# Patient Record
Sex: FEMALE | Race: WHITE
[De-identification: names, ages, dates, MRNs, and addresses within clinical notes are randomized per-mention and may not be internally consistent; named-entity substitution may affect disease eponyms.]

---

## 2017-07-08 NOTE — EDM.PDOC
ED HPI GENERAL MEDICAL PROBLEM





- General


Chief Complaint: Headache


Stated Complaint: MIGRAINE


Time Seen by Provider: 17 17:57


Source of Information: Reports: Patient, RN Notes Reviewed





- History of Present Illness


INITIAL COMMENTS - FREE TEXT/NARRATIVE: 





31-year-old female comes in with headache. This first started 4 days ago. The 

headache is been present for most of the 4 days. She does have history of 

migraines and this is fairly similar. The discomfort this time is more left-

sided and also pounding and throbbing in nature. She has had some nausea with 

this. No recent fever chills or other unusual symptomatology.


Treatments PTA: Reports: Other (see below)


Other Treatments PTA: excederin migraine at 1000


  ** front of head/top left head


Pain Score (Numeric/FACES): 6





- Related Data


 Allergies











Allergy/AdvReac Type Severity Reaction Status Date / Time


 


Penicillins Allergy  Rash Verified 17 17:52


 


codeine AdvReac  Nausea Verified 17 17:52











Home Meds: 


 Home Meds





ALPRAZolam [Alprazolam] 1 tab PO DAILY PRN 17 [History]


Sertraline HCl [Sertraline HCl] 1 tab PO DAILY 17 [History]


atorvaSTATin Calcium [Atorvastatin Calcium] 1 tab PO DAILY 17 [History]


traZODone HCl [Trazodone HCl] 1 tab PO ASDIRECTED 17 [History]











Past Medical History


Cardiovascular History: Reports: High Cholesterol


Neurological History: Reports: Headaches, Chronic, Migraines


Psychiatric History: Reports: Anxiety, Depression





- Past Surgical History


Female  Surgical History: Reports:  Section





Social & Family History





- Family History


Family Medical History: Noncontributory





- Tobacco Use


Smoking Status *Q: Current Every Day Smoker


Years of Tobacco use: 1


Packs/Tins Daily: 1





- Caffeine Use


Caffeine Use: Reports: Soda





- Recreational Drug Use


Recreational Drug Use: No





ED ROS GENERAL





- Review of Systems


Review Of Systems: See Below


Constitutional: Denies: Fever, Chills


HEENT: Denies: Sinus Problem, Throat Pain, Vision Change


Respiratory: Denies: Shortness of Breath


Cardiovascular: Denies: Chest Pain


GI/Abdominal: Reports: Nausea.  Denies: Abdominal Pain, Vomiting


Musculoskeletal: Denies: Neck Pain, Back Pain


Skin: Reports: No Symptoms


Neurological: Reports: Headache.  Denies: Numbness, Tingling





- Physical Exam


Exam: See Below


General Appearance: Alert, Moderate Distress


Eye Exam: Bilateral Eye: PERRL


Throat/Mouth: Normal Inspection, Normal Oropharynx


Head Exam: Atraumatic.  No: Facial Swelling


Neck: Supple, Full Range of Motion


Respiratory/Chest: No Respiratory Distress, Lungs Clear, Normal Breath Sounds


Cardiovascular: Regular Rate, Rhythm


Neuro Exam (Abbreviated): Alert, Oriented, No Motor/Sensory Deficits


Skin Exam: Warm, Dry, Normal Color





Course





- Vital Signs


Last Recorded V/S: 


 Last Vital Signs











Temp  98.1 F   17 17:48


 


Pulse  98   17 17:48


 


Resp  18   17 17:48


 


BP  127/85   17 17:48


 


Pulse Ox  98   17 17:48














- Orders/Labs/Meds


Orders: 


 Active Orders 24 hr











 Category Date Time Status


 


 Peripheral IV Care [RC] .AS DIRECTED Care  17 18:04 Active


 


 Ketorolac [Toradol] Med  17 18:15 Active





 30 mg IVPUSH ONETIME   


 


 Sodium Chloride 0.9% [Normal Saline] 1,000 ml Med  17 18:15 Active





 IV ONETIME   


 


 Sodium Chloride 0.9% [Saline Flush] Med  17 18:04 Active





 10 ml FLUSH ASDIRECTED PRN   


 


 Peripheral IV Insertion Adult [OM.PC] Stat Oth  17 18:04 Ordered








 Medication Orders





Sodium Chloride (Normal Saline)  1,000 mls @ 999 mls/hr IV ONETIME LIZBETH


   Last Admin: 17 18:35  Dose: 999 mls/hr


Ketorolac Tromethamine (Toradol)  30 mg IVPUSH ONETIME LIZBETH


   Last Admin: 17 18:31  Dose: 30 mg


Sodium Chloride (Saline Flush)  10 ml FLUSH ASDIRECTED PRN


   PRN Reason: Keep Vein Open


   Last Admin: 17 18:25  Dose: 10 ml








Meds: 


Medications











Generic Name Dose Route Start Last Admin





  Trade Name Freq  PRN Reason Stop Dose Admin


 


Sodium Chloride  1,000 mls @ 999 mls/hr  17 18:15  17 18:35





  Normal Saline  IV   999 mls/hr





  ONETIME LIZBETH   Administration


 


Ketorolac Tromethamine  30 mg  17 18:15  17 18:31





  Toradol  IVPUSH   30 mg





  ONETIME LIZBETH   Administration


 


Sodium Chloride  10 ml  17 18:04  17 18:25





  Saline Flush  FLUSH   10 ml





  ASDIRECTED PRN   Administration





  Keep Vein Open   














Discontinued Medications














Generic Name Dose Route Start Last Admin





  Trade Name Judy  PRN Reason Stop Dose Admin


 


Diphenhydramine HCl  50 mg  17 18:12  17 18:33





  Benadryl  IVPUSH  17 18:13  50 mg





  ONETIME ONE   Administration


 


Metoclopramide HCl  10 mg  17 18:12  17 18:27





  Reglan  IVPUSH  17 18:13  10 mg





  ONETIME ONE   Administration














- Re-Assessments/Exams


Free Text/Narrative Re-Assessment/Exam: 





17 18:56


Feeling better after IV Toradol, Reglan and Benadryl. The headache is down to 

about a "4". She would like to wait give this a bit longer and then see if she 

is going to need further medication or not prior to discharge. I am at the end 

of my shift. I will visit with nursing staff, Haldol 5 mg IV would be the next 

medication given if needed.





Departure





- Departure


Time of Disposition: 19:25


Disposition: Home, Self-Care 01


Condition: Fair


Clinical Impression: 


 Migraine








- Discharge Information


Forms:  ED Department Discharge


Additional Instructions: 


Rest, continue current medications as previously prescribed, follow-up clinic 

if not much better by morning as expected, return to ED as needed.





- My Orders


Last 24 Hours: 


My Active Orders





17 18:04


Peripheral IV Care [RC] .AS DIRECTED 


Sodium Chloride 0.9% [Saline Flush]   10 ml FLUSH ASDIRECTED PRN 


Peripheral IV Insertion Adult [OM.PC] Stat 





17 18:15


Ketorolac [Toradol]   30 mg IVPUSH ONETIME 


Sodium Chloride 0.9% [Normal Saline] 1,000 ml IV ONETIME 














- Assessment/Plan


Last 24 Hours: 


My Active Orders





17 18:04


Peripheral IV Care [RC] .AS DIRECTED 


Sodium Chloride 0.9% [Saline Flush]   10 ml FLUSH ASDIRECTED PRN 


Peripheral IV Insertion Adult [OM.PC] Stat 





17 18:15


Ketorolac [Toradol]   30 mg IVPUSH ONETIME 


Sodium Chloride 0.9% [Normal Saline] 1,000 ml IV ONETIME

## 2017-12-11 NOTE — EDM.PDOC
ED HPI GENERAL MEDICAL PROBLEM





- General


Chief Complaint: ENT Problem


Stated Complaint: THROAT PAIN AND TROUBLE BREATHING


Time Seen by Provider: 17 02:35





- History of Present Illness


INITIAL COMMENTS - FREE TEXT/NARRATIVE: 





32-year-old female presents emergency room with a sore throat.





This started about a day ago she's noted some swelling to the back of her 

throat. She has not had any fevers or chills. She has not had any abdominal 

pain no nausea no vomiting no diarrhea no cough or congestion.


  ** Throat


Pain Score (Numeric/FACES): 10





- Related Data


 Allergies











Allergy/AdvReac Type Severity Reaction Status Date / Time


 


Penicillins Allergy  Rash Verified 17 02:19


 


codeine AdvReac  Nausea Verified 17 02:19











Home Meds: 


 Home Meds





ALPRAZolam [Alprazolam] 1 tab PO DAILY PRN 17 [History]


Sertraline HCl [Sertraline HCl] 1 tab PO DAILY 17 [History]


atorvaSTATin Calcium [Atorvastatin Calcium] 1 tab PO DAILY 17 [History]


traZODone HCl [Trazodone HCl] 1 tab PO ASDIRECTED 17 [History]


Clindamycin HCl 300 mg PO Q8H #30 capsule 17 [Rx]


predniSONE [Prednisone] 10 mg PO QAM #3 tablet 17 [Rx]











Past Medical History


Cardiovascular History: Reports: High Cholesterol


Neurological History: Reports: Headaches, Chronic, Migraines


Psychiatric History: Reports: Anxiety, Depression





- Past Surgical History


Female  Surgical History: Reports:  Section





Social & Family History





- Family History


Family Medical History: Noncontributory





- Tobacco Use


Smoking Status *Q: Former Smoker


Years of Tobacco use: 15


Packs/Tins Daily: 1


Used Tobacco, but Quit: Yes


Month Tobacco Last Used: 2016





- Caffeine Use


Caffeine Use: Reports: None





- Recreational Drug Use


Recreational Drug Use: No





ED ROS ENT





- Review of Systems


Review Of Systems: Unable To Obtain


Constitutional: Reports: No Symptoms


HEENT: Reports: Throat Pain


Respiratory: Reports: No Symptoms


Cardiovascular: Reports: No Symptoms


GI/Abdominal: Reports: No Symptoms





ED EXAM, ENT





- Physical Exam


Exam: See Below


Exam Limited By: No Limitations


General Appearance: Alert, No Apparent Distress


Ears: Normal External Exam, Normal Canal, Normal TMs


Nose: Normal Inspection, Normal Mucousa


Mouth/Throat: Normal Gums, Normal Lips, Normal Teeth, Other (Posterior pharynx 

is moderately erythematous no exudate mildly swollen)


Head: Atraumatic, Normocephalic


Neck: Normal Inspection, Supple, Non-Tender.  No: Lymphadenopathy (L), 

Lymphadenopathy (R)


Respiratory/Chest: No Respiratory Distress, Lungs Clear, Normal Breath Sounds


Cardiovascular: Normal Peripheral Pulses, Regular Rate, Rhythm, No Edema


GI/Abdominal: Normal Bowel Sounds, Soft, Non-Tender, Other (No splenic or 

hepatic enlargement)





Course





- Vital Signs


Last Recorded V/S: 


 Last Vital Signs











Temp  37.2 C   17 02:15


 


Pulse  94   17 02:15


 


Resp  18   17 02:15


 


BP  149/86 H  17 02:15


 


Pulse Ox  100   17 02:15














- Orders/Labs/Meds


Orders: 


 Active Orders 24 hr











 Category Date Time Status


 


 STREP SCRN A RAPID W CULT CONF [RM] Stat Lab  17 02:31 Results











Meds: 


Medications














Discontinued Medications














Generic Name Dose Route Start Last Admin





  Trade Name Judy  PRN Reason Stop Dose Admin


 


Clindamycin HCl  300 mg  17 03:00  





  Cleocin  PO  17 03:01  





  ONETIME ONE   


 


Prednisone  40 mg  17 02:59  





  Prednisone  PO  17 03:00  





  ONETIME ONE   














- Re-Assessments/Exams


Free Text/Narrative Re-Assessment/Exam: 





17 03:11


Rapid strep is positive she'll be started on clindamycin as she is pen allergic 

and she'll be started on low-dose steroids.





Departure





- Departure


Time of Disposition: 03:11


Disposition: Home, Self-Care 01


Clinical Impression: 


 Strep pharyngitis








- Discharge Information


Prescriptions: 


Clindamycin HCl 300 mg PO Q8H #30 capsule


predniSONE [Prednisone] 10 mg PO QAM #3 tablet


Referrals: 


Ines Young PA-C [Primary Care Provider] - 


Forms:  ED Department Discharge


Additional Instructions: 


Return to the emergency room with any questions problems or worsening symptoms.





You been started on 2 medications the first ones prednisone this is a steroid 

this is to help with the discomfort and swelling in the area your given 40 mg 

in the emergency room this morning starting Tuesday morning when she did take 

30 mg every morning for 3 days. You have also been started on clindamycin this 

is an antibiotic he take one 3 times a day for 10 days.





Follow-up in the clinic at the end of this week if needed. You are contagious 

for the first 24 hours after starting therapy.





- My Orders


Last 24 Hours: 


My Active Orders





17 02:31


STREP SCRN A RAPID W CULT CONF [RM] Stat 














- Assessment/Plan


Last 24 Hours: 


My Active Orders





17 02:31


STREP SCRN A RAPID W CULT CONF [RM] Stat

## 2019-09-19 ENCOUNTER — HOSPITAL ENCOUNTER (EMERGENCY)
Dept: HOSPITAL 41 - JD.ED | Age: 34
LOS: 1 days | Discharge: PSYCHIATRIC OR SUBSTANCE USE FACILITY | End: 2019-09-20
Payer: COMMERCIAL

## 2019-09-19 VITALS — SYSTOLIC BLOOD PRESSURE: 150 MMHG | DIASTOLIC BLOOD PRESSURE: 88 MMHG

## 2019-09-19 DIAGNOSIS — F32.9: Primary | ICD-10-CM

## 2019-09-19 DIAGNOSIS — F17.210: ICD-10-CM

## 2019-09-19 DIAGNOSIS — E78.00: ICD-10-CM

## 2019-09-19 DIAGNOSIS — Z88.5: ICD-10-CM

## 2019-09-19 DIAGNOSIS — F41.9: ICD-10-CM

## 2019-09-19 DIAGNOSIS — Z88.0: ICD-10-CM

## 2019-09-19 DIAGNOSIS — Z79.899: ICD-10-CM

## 2019-09-19 LAB — APAP SERPL-MCNC: 0 UG/ML (ref 10–30)

## 2019-09-19 PROCEDURE — G0480 DRUG TEST DEF 1-7 CLASSES: HCPCS

## 2019-09-19 NOTE — EDM.PDOCBH
ED HPI GENERAL MEDICAL PROBLEM





- General


Chief Complaint: Behavioral/Psych


Stated Complaint: HAVING BAD THOUGHTS


Time Seen by Provider: 19 19:02


Source of Information: Reports: Patient


History Limitations: Reports: Other (Flat affect, slightly angry, reluctant to 

answer questions or elaborate)





- History of Present Illness


INITIAL COMMENTS - FREE TEXT/NARRATIVE: 





The patient states that she felt like harming herself by driving her car in to 

something, on 2 occasions today. She acknowledges that she is under increased 

stress. She states that she filed a restraining order on a coworker, but that 

the coworker violated the restraining order. She doesn't feel threatened, 

exactly, but she does feel unsafe. She denies having harmed herself today, or 

ever. She denies drinking alcohol or using any recreational drugs, recently.





The patient states that she has had similar thoughts of harming herself on 2 

prior occasions; the first when she was in high school, the second about 3 

years ago. On neither of those occasions was she psychiatrically evaluated, 

however, her PCP started her on Wellbutrin and sertraline after the episode 3 

years ago. The patient states that she has never seen a Psychiatrist or been 

psychiatrically hospitalized.





She states that she stopped all of her medications, including her Wellbutrin, 

sertraline, alprazolam, metformin, and atorvastatin about 2 weeks ago. She 

states that she didn't think she needed them anymore.





The patient denies having recent illness, such as fever, chills, cough, dyspnea

, chest pain, palpitations, nausea, vomiting, constipation, diarrhea, abdominal 

pain, urinary symptoms, recent weight gain or weight loss, recent bloody bowel 

movements or black bowel movements, joint aches, headaches, or rashes.





The patient's PCP is MESFIN De La Paz.


She has seen Brooklyn Carrillo NP for women's health in the past.











- Related Data


 Allergies











Allergy/AdvReac Type Severity Reaction Status Date / Time


 


Penicillins Allergy  Rash Verified 19 18:52


 


codeine AdvReac  Nausea Verified 19 18:52











Home Meds: 


 Home Meds





ALPRAZolam [Alprazolam] 1 tab PO DAILY PRN 17 [History]


Sertraline HCl 150 mg PO DAILY 17 [History]


atorvaSTATin Calcium [Atorvastatin Calcium] 1 tab PO DAILY 17 [History]


traZODone HCl [Trazodone HCl] 1 tab PO ASDIRECTED 17 [History]


Copper [Paragard T 380-A] 1 each IY ASDIRECTED 19 [History]


Hydrocodone Bit/Homatrop Me-Br [Hydrocodone Compound Syrup] 5 ml PO Q6H PRN #1 

bottle 19 [Rx]


buPROPion [Wellbutrin] 0 mg PO DAILY 19 [History]


metFORMIN [Glucophage] 500 mg PO BIDMEALS 19 [History]











Past Medical History


HEENT History: Reports: Impaired Vision


Cardiovascular History: Reports: High Cholesterol (untreated)


OB/GYN History: Reports: Pregnancy


Psychiatric History: Reports: Anxiety (untreated), Depression (untreated)


Endocrine/Metabolic History: Reports: Other (See Below) (Insulin resistance, 

untreated)





- Past Surgical History


HEENT Surgical History: Reports: Oral Surgery (wisdom teeth extraction)


Female  Surgical History: Reports:  Section (x 3)





Social & Family History





- Family History


Family Medical History: Noncontributory





- Tobacco Use


Smoking Status *Q: Current Every Day Smoker


Years of Tobacco use: 15


Packs/Tins Daily: 0.3


Packs/Tins Daily Comment: Down from 1/2 ppd





- Caffeine Use


Caffeine Use: Reports: Coffee, Soda





- Alcohol Use


Alcohol Use History: Yes


Alcohol Use Frequency: Socially (to excess on occasion)





- Recreational Drug Use


Recreational Drug Use: Yes


Drug Use in Last 12 Months: No


Recreational Drug Type: Reports: Marijuana/Hashish (last smoked in HS)





- Living Situation & Occupation


Living situation: Reports: , with Family (3 kids)


Occupation: Employed ([employer])





ED ROS GENERAL





- Review of Systems


Review Of Systems: ROS reveals no pertinent complaints other than HPI.


Neurological: Reports: Headache (frequent)





ED EXAM, BEHAVIORAL HEALTH





- Physical Exam


Exam: See Below


Exam Limited By: No Limitations


General Appearance: Alert, WD/WN, No Apparent Distress


Eye Exam: Bilateral Eye: EOMI, Normal Inspection


Ears: Normal External Exam, Hearing Grossly Normal


Nose: Normal Inspection


Throat/Mouth: Normal Inspection, Normal Lips, Normal Voice, No Airway Compromise


Head: Atraumatic, Normocephalic


Neck: Normal Inspection, Full Range of Motion


Respiratory/Chest: No Respiratory Distress, Lungs Clear, Normal Breath Sounds, 

No Accessory Muscle Use


Cardiovascular: Normal Peripheral Pulses, Regular Rate, Rhythm, No Gallop, No 

JVD, No Murmur, No Rub


GI/Abdominal: Normal Bowel Sounds, Soft, Non-Tender, No Organomegaly, No 

Distention, No Abnormal Bruit, No Mass


 (Female) Exam: Deferred


Rectal (Female) Exam: Deferred


Back Exam: Normal Inspection, Full Range of Motion, NT


Extremities: Normal Inspection, Normal Range of Motion, No Pedal Edema, Normal 

Capillary Refill


Neurological: Alert, Normal Cognition, No Motor/Sensory Deficits, Oriented x 3


Psychiatric: Flat Affect


Skin Exam: Warm, Dry, Intact, Normal color, No rash





EKG INTERPRETATION


EKG Date: 19


Time: 19:47


Rhythm: NSR


Rate (Beats/Min): 84


Axis: Normal


P-Wave: Present


QRS: Normal


ST-T: Normal


QT: Normal


Comparison: NA - No Prior EKG





COURSE, BEHAVIORAL HEALTH COMP





- Course


Vital Signs: 


 Last Vital Signs











Temp  36.4 C   19 18:50


 


Pulse      


 


Resp  20   19 18:50


 


BP  150/88 H  19 18:50


 


Pulse Ox  99   19 18:50











Orders, Labs, Meds: 


 Active Orders 24 hr











 Category Date Time Status


 


 EKG Documentation Completion [RC] STAT Care  19 19:35 Active








 Laboratory Tests











  19 Range/Units





  19:45 19:45 19:54 


 


WBC    10.88 H  (3.98-10.04)  K/mm3


 


RBC    4.33  (3.98-5.22)  M/mm3


 


Hgb    13.0  (11.2-15.7)  gm/dl


 


Hct    37.8  (34.1-44.9)  %


 


MCV    87.3  (79.4-94.8)  fl


 


MCH    30.0  (25.6-32.2)  pg


 


MCHC    34.4  (32.2-35.5)  g/dl


 


RDW Std Deviation    39.4  (36.4-46.3)  fL


 


Plt Count    389 H  (182-369)  K/mm3


 


MPV    10.4  (9.4-12.3)  fl


 


Neutrophils % (Manual)    63 H  (40-60)  %


 


Band Neutrophils %    0  (0-10)  %


 


Lymphocytes % (Manual)    26  (20-40)  %


 


Atypical Lymphs %    0  %


 


Monocytes % (Manual)    7  (2-10)  %


 


Eosinophils % (Manual)    4  (0.7-5.8)  %


 


Basophils % (Manual)    0 L  (0.1-1.2)  


 


Platelet Estimate    Adequate  


 


RBC Morph Comment    Normal  


 


Sodium     (136-145)  mEq/L


 


Potassium     (3.5-5.1)  mEq/L


 


Chloride     ()  mEq/L


 


Carbon Dioxide     (21-32)  mEq/L


 


Anion Gap     (5-15)  


 


BUN     (7-18)  mg/dL


 


Creatinine     (0.55-1.02)  mg/dL


 


Est Cr Clr Drug Dosing     mL/min


 


Estimated GFR (MDRD)     (>60)  mL/min


 


BUN/Creatinine Ratio     (14-18)  


 


Glucose     ()  mg/dL


 


Calcium     (8.5-10.1)  mg/dL


 


Total Bilirubin     (0.2-1.0)  mg/dL


 


AST     (15-37)  U/L


 


ALT     (14-59)  U/L


 


Alkaline Phosphatase     ()  U/L


 


Total Protein     (6.4-8.2)  g/dl


 


Albumin     (3.4-5.0)  g/dl


 


Globulin     gm/dL


 


Albumin/Globulin Ratio     (1-2)  


 


TSH 3rd Generation     (0.358-3.74)  uIU/mL


 


Urine HCG, Qual  Negative    (NEGATIVE)  


 


Salicylates     (2.8-20)  mg/dL


 


Urine Opiates Screen   Negative   (XWVAHC=889)  


 


Ur Buprenorphine Scrn   Negative   (CUTOFF=10)  


 


Ur Oxycodone Screen   Negative   (OCR9QX=595)  


 


Urine Methadone Screen   Negative   (PWMAMF=549)  


 


Ur Propoxyphene Screen   Negative   (OTYYCE=858)  


 


Acetaminophen     (10-30)  ug/mL


 


Ur Barbiturates Screen   Negative   (RQAXAT=204)  


 


Ur Tricyclics Screen   Negative   (FHFWVL=096)  


 


Ur Phencyclidine Scrn   Negative   (CUTOFF=25)  


 


Ur Amphetamine Screen   Negative   (XSHDFY=618)  


 


U Methamphetamines Scrn   Negative   (VEMODF=468)  


 


U Benzodiazepines Scrn   Negative   (JQUIXT=511)  


 


U Cocaine Metab Screen   Negative   (BPLRVU=464)  


 


U Marijuana (THC) Screen   Negative   (CUTOFF=50)  


 


Ethyl Alcohol     (0.00)  gm%














  19 Range/Units





  19:54 19:54 


 


WBC    (3.98-10.04)  K/mm3


 


RBC    (3.98-5.22)  M/mm3


 


Hgb    (11.2-15.7)  gm/dl


 


Hct    (34.1-44.9)  %


 


MCV    (79.4-94.8)  fl


 


MCH    (25.6-32.2)  pg


 


MCHC    (32.2-35.5)  g/dl


 


RDW Std Deviation    (36.4-46.3)  fL


 


Plt Count    (182-369)  K/mm3


 


MPV    (9.4-12.3)  fl


 


Neutrophils % (Manual)    (40-60)  %


 


Band Neutrophils %    (0-10)  %


 


Lymphocytes % (Manual)    (20-40)  %


 


Atypical Lymphs %    %


 


Monocytes % (Manual)    (2-10)  %


 


Eosinophils % (Manual)    (0.7-5.8)  %


 


Basophils % (Manual)    (0.1-1.2)  


 


Platelet Estimate    


 


RBC Morph Comment    


 


Sodium  142   (136-145)  mEq/L


 


Potassium  3.3 L   (3.5-5.1)  mEq/L


 


Chloride  107   ()  mEq/L


 


Carbon Dioxide  24   (21-32)  mEq/L


 


Anion Gap  14.3   (5-15)  


 


BUN  11   (7-18)  mg/dL


 


Creatinine  0.8   (0.55-1.02)  mg/dL


 


Est Cr Clr Drug Dosing  75.48   mL/min


 


Estimated GFR (MDRD)  > 60   (>60)  mL/min


 


BUN/Creatinine Ratio  13.8 L   (14-18)  


 


Glucose  96   ()  mg/dL


 


Calcium  9.2   (8.5-10.1)  mg/dL


 


Total Bilirubin  0.2   (0.2-1.0)  mg/dL


 


AST  10 L   (15-37)  U/L


 


ALT  22   (14-59)  U/L


 


Alkaline Phosphatase  55   ()  U/L


 


Total Protein  7.3   (6.4-8.2)  g/dl


 


Albumin  3.7   (3.4-5.0)  g/dl


 


Globulin  3.6   gm/dL


 


Albumin/Globulin Ratio  1.0   (1-2)  


 


TSH 3rd Generation  2.062   (0.358-3.74)  uIU/mL


 


Urine HCG, Qual    (NEGATIVE)  


 


Salicylates   1.2 L  (2.8-20)  mg/dL


 


Urine Opiates Screen    (PHQTGS=274)  


 


Ur Buprenorphine Scrn    (CUTOFF=10)  


 


Ur Oxycodone Screen    (BEG5FY=118)  


 


Urine Methadone Screen    (ZIGBNS=910)  


 


Ur Propoxyphene Screen    (BIJEKT=996)  


 


Acetaminophen  0 L   (10-30)  ug/mL


 


Ur Barbiturates Screen    (RQBBSG=097)  


 


Ur Tricyclics Screen    (DQVGOP=087)  


 


Ur Phencyclidine Scrn    (CUTOFF=25)  


 


Ur Amphetamine Screen    (CWSCWH=045)  


 


U Methamphetamines Scrn    (WBXUMS=491)  


 


U Benzodiazepines Scrn    (PLIBNG=348)  


 


U Cocaine Metab Screen    (WHMHSC=276)  


 


U Marijuana (THC) Screen    (CUTOFF=50)  


 


Ethyl Alcohol  0.00   (0.00)  gm%











Medical Clearance: 





19 19:36


The patient appears to be suffering from significant depression and suicidal 

ideation, and while she has not attempted to harm herself, she does have a plan 

of sorts, therefore she would likely benefit from psychiatric hospitalization. 

The alternative would be to have her restart her Wellbutrin and sertraline and 

have her follow-up with a Psychiatrist as an outpatient, but that could take 

months. The patient is agreeable to being psychiatrically admitted. I have 

ordered a psychiatric medical clearance panel.








19 20:05


Notified by Brooklyn ROUSE that the patient asked for her phone, so that she could 

call her  to come get her. As above, however, the patient is suicidal 

and has a plan, therefore I think she needs to be psychiatrically admitted. 

Since she is now asking to leave, I think she will need to be involuntarily 

admitted.








19 21:38


The patient's CBC is remarkable for WBC count mildly elevated at 10.88, but 

with 0% bandemia. Her platelets are elevated at 389,000. The remainder of her 

CBC is unremarkable.


Her CMP is remarkable for a potassium slightly depressed at 3.3, with the 

remainder of her CMP being unremarkable.


Her acetaminophen level is 0.


Her salicylate level is within normal limits at 1.2.


Her EtOH level is 0.


Her TSH is within normal limits at 2.062.


Her urine drug screen is completely negative.


Her urine pregnancy test is negative.





Notified that the patient called her , and that he is in the waiting room

, however, Brooklyn ROUSE is not comfortable in allowing him to come back to see the 

patient.





We will begin looking for a psychiatric bed.








19 22:01


Case discussed with Dr. Landon Sena, Psychiatrist at [facility], at 

21:48. He accepted the patient for admission to their psychiatric unit. The 

patient will need to go involuntarily, by the [facility] department

, therefore I will fill out the 24-hour hold paperwork.








19 22:15


Notified that the [facility] [place] will not be able to transport the 

patient to [place] until the morning.





Departure





- Departure


Time of Disposition: 22:05


Disposition: DC/Tfer to Psych Hosp/Unit [facility]


Condition: Good


Clinical Impression: 


 Suicidal ideation, Depression








- Discharge Information


*PRESCRIPTION DRUG MONITORING PROGRAM REVIEWED*: Not Applicable


*COPY OF PRESCRIPTION DRUG MONITORING REPORT IN PATIENT [facility]: Not Applicable


Referrals: 


Ines Young PA-C [Primary Care Provider] - 





- My Orders


Last 24 Hours: 


My Active Orders





19 19:35


EKG Documentation Completion [RC] STAT 














- Assessment/Plan


Last 24 Hours: 


My Active Orders





19 19:35


EKG Documentation Completion [RC] STAT